# Patient Record
Sex: FEMALE | URBAN - METROPOLITAN AREA
[De-identification: names, ages, dates, MRNs, and addresses within clinical notes are randomized per-mention and may not be internally consistent; named-entity substitution may affect disease eponyms.]

---

## 2024-06-17 ENCOUNTER — OFFICE (OUTPATIENT)
Dept: URBAN - METROPOLITAN AREA CLINIC 79 | Facility: CLINIC | Age: 49
End: 2024-06-17

## 2024-06-17 VITALS
SYSTOLIC BLOOD PRESSURE: 125 MMHG | WEIGHT: 159 LBS | HEART RATE: 80 BPM | HEIGHT: 65 IN | TEMPERATURE: 98.2 F | DIASTOLIC BLOOD PRESSURE: 75 MMHG

## 2024-06-17 DIAGNOSIS — R14.0 ABDOMINAL DISTENSION (GASEOUS): ICD-10-CM

## 2024-06-17 DIAGNOSIS — R10.9 UNSPECIFIED ABDOMINAL PAIN: ICD-10-CM

## 2024-06-17 DIAGNOSIS — K62.89 OTHER SPECIFIED DISEASES OF ANUS AND RECTUM: ICD-10-CM

## 2024-06-17 DIAGNOSIS — K62.5 HEMORRHAGE OF ANUS AND RECTUM: ICD-10-CM

## 2024-06-17 DIAGNOSIS — Z86.19 PERSONAL HISTORY OF OTHER INFECTIOUS AND PARASITIC DISEASES: ICD-10-CM

## 2024-06-17 DIAGNOSIS — R19.7 DIARRHEA, UNSPECIFIED: ICD-10-CM

## 2024-06-17 DIAGNOSIS — R13.10 DYSPHAGIA, UNSPECIFIED: ICD-10-CM

## 2024-06-17 DIAGNOSIS — L29.0 PRURITUS ANI: ICD-10-CM

## 2024-06-17 PROCEDURE — 99204 OFFICE O/P NEW MOD 45 MIN: CPT | Performed by: NURSE PRACTITIONER

## 2024-06-17 RX ORDER — PANTOPRAZOLE SODIUM 20 MG/1
20 TABLET, DELAYED RELEASE ORAL
Qty: 30 | Refills: 5 | Status: ACTIVE
Start: 2024-06-17

## 2024-06-17 RX ORDER — HYDROCORTISONE ACETATE 25 MG/1
25 SUPPOSITORY RECTAL
Qty: 7 | Refills: 0 | Status: ACTIVE
Start: 2024-06-17

## 2024-06-17 NOTE — SERVICEHPINOTES
ANDREW REMY   is a   49  female who complains of epigastric abdominal pain that is there daily for 2 week.  Tums/gas X  helps abdominal pain some.  Eating spicy food makes it worse.   2 weeks ago, started with vomiting and diarrhea. Went to PCP and prescribed dicyclomine. No longer having vomiting but still having diarrhea.  Prior was having a BM 1 time a day, BSS 4. Has rectal discomfort that is there all the time. br
Having a BM 2 times a day, BSS 6-7
br 
brPt reports she has internal hemorrhoids went to ER for rectal pain 2 years ago and told it was her "internal hemorrhoids" and they used a "plastic scope to look in rectum" has been using hydrocortisone for 2 years on and off. 
br
[place] Has occasional rectal bleeding when wiping 3 days ago and then 6 months ago
br
br +h pylori 8 years ago and was treated from a [facility] in [place] and treated and repeated EGD and was negative. Denies recent abx use, recent travel outside of , no known sick contacts,  br
br + dysphagia when she eats feels like her throat is closing on her, with solids, one to 2 times a month.
br
brDenies fever, odynophagia, pyrosis. br
br Does not see a cardiologist, no blood thinners,  
br
br No family history of CRC, IBD 
br
br Does not drink alcohol
br br + uses advil 2-3 times a week. br
br Gallbladder removed- 2012br
br